# Patient Record
Sex: MALE | HISPANIC OR LATINO
[De-identification: names, ages, dates, MRNs, and addresses within clinical notes are randomized per-mention and may not be internally consistent; named-entity substitution may affect disease eponyms.]

---

## 2024-11-18 ENCOUNTER — DASHBOARD ENCOUNTERS (OUTPATIENT)
Age: 29
End: 2024-11-18

## 2024-11-18 ENCOUNTER — TELEPHONE ENCOUNTER (OUTPATIENT)
Dept: URBAN - METROPOLITAN AREA CLINIC 96 | Facility: CLINIC | Age: 29
End: 2024-11-18

## 2024-11-18 ENCOUNTER — OFFICE VISIT (OUTPATIENT)
Dept: URBAN - METROPOLITAN AREA CLINIC 96 | Facility: CLINIC | Age: 29
End: 2024-11-18
Payer: SELF-PAY

## 2024-11-18 VITALS
WEIGHT: 137 LBS | HEART RATE: 59 BPM | BODY MASS INDEX: 21.5 KG/M2 | SYSTOLIC BLOOD PRESSURE: 107 MMHG | HEIGHT: 67 IN | DIASTOLIC BLOOD PRESSURE: 64 MMHG | TEMPERATURE: 97.7 F

## 2024-11-18 DIAGNOSIS — R10.11 RUQ PAIN: ICD-10-CM

## 2024-11-18 DIAGNOSIS — F10.11 HISTORY OF ALCOHOL ABUSE: ICD-10-CM

## 2024-11-18 DIAGNOSIS — R68.81 EARLY SATIETY: ICD-10-CM

## 2024-11-18 DIAGNOSIS — R11.0 NAUSEA: ICD-10-CM

## 2024-11-18 DIAGNOSIS — R10.13 DYSPEPSIA: ICD-10-CM

## 2024-11-18 PROBLEM — 371434005: Status: ACTIVE | Noted: 2024-11-18

## 2024-11-18 PROCEDURE — 99204 OFFICE O/P NEW MOD 45 MIN: CPT | Performed by: INTERNAL MEDICINE

## 2024-11-18 RX ORDER — PANTOPRAZOLE SODIUM 20 MG/1
TAKE 2 TABLETS BY MOUTH TWICE A DAY X 5 DAYS, THEN ONCE DAILY X TOTAL OF 2 WEEKS TABLET, DELAYED RELEASE ORAL
Qty: 36 EACH | Refills: 0 | Status: ACTIVE | COMMUNITY

## 2024-11-18 RX ORDER — DICYCLOMINE HYDROCHLORIDE 20 MG/1
TAKE 1 TABLET BY MOUTH TWICE A DAY TABLET ORAL
Qty: 20 EACH | Refills: 0 | Status: ACTIVE | COMMUNITY

## 2024-11-18 NOTE — HPI-TODAY'S VISIT:
Patient presents self referred. (Bengali speaking patient, interpretation provided via iPad)   29 yo male reports was recently was seen in [facility] ER c/o RUQ abdominal pain, nausea. Reports prior such pain intermittent for the past 2 years. No remitting or exacerbating factors. Occasionally worse after eating, no particular food trigger. No dysphagia, no odynophagia, no chronic NSAIDs. No hx of PUD. No prior EGD. No jaundice, no icterus.  No early satiety, no bloating.   No food allergies or diet restrictions. Prior hx of alcohol use prior rehab, reports no longer drinks for the past 4 months. No chronic MJ.   No melena, no rectal bleeding, no change in BM.   ER gave him pantoprazole and dicyclomine which has helped.

## 2025-01-17 ENCOUNTER — OFFICE VISIT (OUTPATIENT)
Dept: URBAN - METROPOLITAN AREA SURGERY CENTER 18 | Facility: SURGERY CENTER | Age: 30
End: 2025-01-17

## 2025-02-28 ENCOUNTER — OFFICE VISIT (OUTPATIENT)
Dept: URBAN - METROPOLITAN AREA SURGERY CENTER 18 | Facility: SURGERY CENTER | Age: 30
End: 2025-02-28

## 2025-02-28 RX ORDER — PANTOPRAZOLE SODIUM 20 MG/1
TAKE 2 TABLETS BY MOUTH TWICE A DAY X 5 DAYS, THEN ONCE DAILY X TOTAL OF 2 WEEKS TABLET, DELAYED RELEASE ORAL
Qty: 36 EACH | Refills: 0 | Status: ACTIVE | COMMUNITY

## 2025-02-28 RX ORDER — DICYCLOMINE HYDROCHLORIDE 20 MG/1
TAKE 1 TABLET BY MOUTH TWICE A DAY TABLET ORAL
Qty: 20 EACH | Refills: 0 | Status: ACTIVE | COMMUNITY

## 2025-04-18 ENCOUNTER — TELEPHONE ENCOUNTER (OUTPATIENT)
Dept: URBAN - METROPOLITAN AREA CLINIC 96 | Facility: CLINIC | Age: 30
End: 2025-04-18

## 2025-05-09 ENCOUNTER — OFFICE VISIT (OUTPATIENT)
Dept: URBAN - METROPOLITAN AREA SURGERY CENTER 18 | Facility: SURGERY CENTER | Age: 30
End: 2025-05-09

## 2025-05-30 ENCOUNTER — OFFICE VISIT (OUTPATIENT)
Dept: URBAN - METROPOLITAN AREA SURGERY CENTER 18 | Facility: SURGERY CENTER | Age: 30
End: 2025-05-30